# Patient Record
(demographics unavailable — no encounter records)

---

## 2020-09-24 NOTE — REPVR
PROCEDURE INFORMATION: 

Exam: XR Chest, 1 View 

Exam date and time: 9/24/2020 12:47 PM 

Age: 73 years old 

Clinical indication: Chest pain; Type not specified 



TECHNIQUE: 

Imaging protocol: XR of the chest 

Views: 1 view. 



COMPARISON: 

No relevant prior studies available. 



FINDINGS: 

Lungs: Unremarkable. No consolidation. 

Pleural space: Unremarkable. No pleural effusion. No pneumothorax. 

Heart/Mediastinum: Unremarkable. No cardiomegaly. 

Bones/joints: Degenerative change of the spine. 



IMPRESSION: 

No acute cardiopulmonary abnormality. 



Electronically signed by: Rebecca Walker On 09/24/2020  13:00:13 PM

## 2020-09-24 NOTE — ECGEPIP
[facility] - ED

                                       

                                       Test Date:    2020

Pat Name:     MERCEDEZ GONSALEZ              Department:   

Patient ID:   [id number]                 Room:         -

Gender:       Male                     Technician:   anamaria

:          1947               Requested By: LEX RUBIO

Order Number: [id number]     Reading MD:   Lorrie Hernandez

                                 Measurements

Intervals                              Axis          

Rate:         65                       P:            6

UT:           168                      QRS:          -42

QRSD:         103                      T:            77

QT:           407                                    

QTc:          424                                    

                           Interpretive Statements

SINUS RHYTHM

MARKED LEFT AXIS DEVIATION

POSSIBLE ANTERIOR MYOCARDIAL INFARCTION, OF INDETERMINATE AGE

SIMILAR 20

Electronically Signed on 2020 20:56:25 EDT by Lorrie Hernandez